# Patient Record
Sex: MALE | Race: WHITE
[De-identification: names, ages, dates, MRNs, and addresses within clinical notes are randomized per-mention and may not be internally consistent; named-entity substitution may affect disease eponyms.]

---

## 2019-01-07 PROBLEM — Z00.00 ENCOUNTER FOR PREVENTIVE HEALTH EXAMINATION: Status: ACTIVE | Noted: 2019-01-07

## 2019-01-16 ENCOUNTER — APPOINTMENT (OUTPATIENT)
Dept: ORTHOPEDIC SURGERY | Facility: CLINIC | Age: 59
End: 2019-01-16
Payer: COMMERCIAL

## 2019-01-16 VITALS
WEIGHT: 280 LBS | DIASTOLIC BLOOD PRESSURE: 88 MMHG | SYSTOLIC BLOOD PRESSURE: 138 MMHG | BODY MASS INDEX: 37.93 KG/M2 | HEART RATE: 58 BPM | HEIGHT: 72 IN

## 2019-01-16 DIAGNOSIS — M18.12 UNILATERAL PRIMARY OSTEOARTHRITIS OF FIRST CARPOMETACARPAL JOINT, LEFT HAND: ICD-10-CM

## 2019-01-16 DIAGNOSIS — Z87.442 PERSONAL HISTORY OF URINARY CALCULI: ICD-10-CM

## 2019-01-16 PROCEDURE — 73130 X-RAY EXAM OF HAND: CPT | Mod: LT

## 2019-01-16 PROCEDURE — 99203 OFFICE O/P NEW LOW 30 MIN: CPT

## 2019-01-16 NOTE — HISTORY OF PRESENT ILLNESS
[Right] : right hand dominant [FreeTextEntry1] : He comes in today for evaluation of left thumb pain for the past 3 months.  He denies an injury.

## 2019-01-16 NOTE — PHYSICAL EXAM
[de-identified] : - Constitutional: This is a male, in no obvious distress.  \par - Psych: Patient is alert and oriented to person, place and time.  Patient has a normal mood and affect.\par - Cardiovascular: Normal pulses throughout the upper extremities.\par - Musculoskeletal: Gait is normal.  \par - Neuro: Strength and sensation are intact throughout the upper extremities.  Patient has normal coordination.\par - Respiratory:  Patient exhibits no evidence of shortness of breath or difficulty breathing.\par - Skin: No rashes, lesions, or other abnormalities are noted in the upper extremities.\par \par ---\par \par Side:  Left Thumb\par -  There is swelling and tenderness along the basal joint of the thumb.  \par -  There is a positive grind test.  \par -  There is no instability of the basal joint of the thumb.  \par -  There is no evidence of a trigger thumb.  \par -  There is no evidence of DeQuervain's tendonitis.  \par -  Provocative signs for carpal tunnel syndrome are negative.  \par -  There is normal strength and sensation distally along the radial, ulnar and median nerve distributions.  \par -  There is full composite range-of-motion of the other digits into the palm.   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

## 2019-01-16 NOTE — DISCUSSION/SUMMARY
[FreeTextEntry1] : He has findings consistent with left thumb pain, secondary to basal joint arthritis.\par \par I had a discussion regarding today's visit, the diagnosis, and treatment options / recommendations.  We discussed other symptomatic treatment, and observation or cortisone injection.  At this time, he has held off on cortisone injection.  If his symptoms become more problematic, he will follow up in the future for an injection.\par \par The patient has agreed to this plan of management and has expressed full understanding.  All questions were fully answered to the patient's satisfaction.\par \par Over 50% of the time spent with the patient was on counseling the patient on the above diagnosis, treatment plan and prognosis.